# Patient Record
(demographics unavailable — no encounter records)

---

## 2025-07-22 NOTE — HEALTH RISK ASSESSMENT
[Yes] : Yes [2 - 4 times a month (2 pts)] : 2-4 times a month (2 points) [1 or 2 (0 pts)] : 1 or 2 (0 points) [Never (0 pts)] : Never (0 points) [No] : In the past 12 months have you used drugs other than those required for medical reasons? No [0] : 2) Feeling down, depressed, or hopeless: Not at all (0) [PHQ-2 Negative - No further assessment needed] : PHQ-2 Negative - No further assessment needed [Patient reported colonoscopy was normal] : Patient reported colonoscopy was normal [None] : None [With Significant Other] : lives with significant other [Employed] : employed [Significant Other] : lives with significant other [Fully functional (bathing, dressing, toileting, transferring, walking, feeding)] : Fully functional (bathing, dressing, toileting, transferring, walking, feeding) [Fully functional (using the telephone, shopping, preparing meals, housekeeping, doing laundry, using] : Fully functional and needs no help or supervision to perform IADLs (using the telephone, shopping, preparing meals, housekeeping, doing laundry, using transportation, managing medications and managing finances) [Never] : Never [Audit-CScore] : 2 [de-identified] : walking 10k steps per day [de-identified] : balanced, low meat [[id number]] : 0 [HIV test declined] : HIV test declined [Hepatitis C test declined] : Hepatitis C test declined [Change in mental status noted] : No change in mental status noted [ColonoscopyDate] : 10/24 [ColonoscopyComments] : f/u 1 year [FreeTextEntry2] :  - [place]

## 2025-07-22 NOTE — HISTORY OF PRESENT ILLNESS
[FreeTextEntry1] : Establish care, CPE [de-identified] : 37 yo male presents to establish care, CPE. Last CPE was about 1 year ago. Patient has noticed warts on R hand for past few months. Has tried OTC remedies without relief. Patient c/o intermittent chest pains on left/middle side. Describes it as sharp. Occurs 1-2 times per week. Episodes never last more than 1 minute. Never occurs with exercise, states it usually occurs with sitting and may occur after eating, has also occurred while laying down. Has not tried any OTC meds.  Patient had colonoscopy Oct 2024, found few benign polyps with hemorrhoids. Rec f/u 1 year.  Feeling well today.